# Patient Record
Sex: MALE | Race: OTHER | HISPANIC OR LATINO | Employment: UNEMPLOYED | URBAN - METROPOLITAN AREA
[De-identification: names, ages, dates, MRNs, and addresses within clinical notes are randomized per-mention and may not be internally consistent; named-entity substitution may affect disease eponyms.]

---

## 2022-07-12 ENCOUNTER — HOSPITAL ENCOUNTER (EMERGENCY)
Facility: HOSPITAL | Age: 27
Discharge: HOME | End: 2022-07-12

## 2022-07-12 ENCOUNTER — APPOINTMENT (EMERGENCY)
Dept: RADIOLOGY | Facility: HOSPITAL | Age: 27
End: 2022-07-12

## 2022-07-12 VITALS
SYSTOLIC BLOOD PRESSURE: 142 MMHG | TEMPERATURE: 98.7 F | DIASTOLIC BLOOD PRESSURE: 85 MMHG | HEART RATE: 85 BPM | RESPIRATION RATE: 20 BRPM | WEIGHT: 167.55 LBS | OXYGEN SATURATION: 99 %

## 2022-07-12 DIAGNOSIS — S43.015A ANTERIOR DISLOCATION OF LEFT SHOULDER: Primary | ICD-10-CM

## 2022-07-12 PROCEDURE — 73030 X-RAY EXAM OF SHOULDER: CPT

## 2022-07-12 PROCEDURE — 23650 CLTX SHO DSLC W/MNPJ WO ANES: CPT | Performed by: PHYSICIAN ASSISTANT

## 2022-07-12 PROCEDURE — 73020 X-RAY EXAM OF SHOULDER: CPT

## 2022-07-12 PROCEDURE — 99285 EMERGENCY DEPT VISIT HI MDM: CPT | Performed by: PHYSICIAN ASSISTANT

## 2022-07-12 PROCEDURE — 96372 THER/PROPH/DIAG INJ SC/IM: CPT

## 2022-07-12 PROCEDURE — 99283 EMERGENCY DEPT VISIT LOW MDM: CPT

## 2022-07-12 RX ORDER — MORPHINE SULFATE 10 MG/ML
6 INJECTION, SOLUTION INTRAMUSCULAR; INTRAVENOUS ONCE
Status: DISCONTINUED | OUTPATIENT
Start: 2022-07-12 | End: 2022-07-12

## 2022-07-12 RX ORDER — DIAZEPAM 5 MG/1
5 TABLET ORAL ONCE
Status: COMPLETED | OUTPATIENT
Start: 2022-07-12 | End: 2022-07-12

## 2022-07-12 RX ORDER — PROPOFOL 10 MG/ML
150 INJECTION, EMULSION INTRAVENOUS ONCE
Status: DISCONTINUED | OUTPATIENT
Start: 2022-07-12 | End: 2022-07-13 | Stop reason: HOSPADM

## 2022-07-12 RX ORDER — MORPHINE SULFATE 10 MG/ML
6 INJECTION, SOLUTION INTRAMUSCULAR; INTRAVENOUS ONCE
Status: COMPLETED | OUTPATIENT
Start: 2022-07-12 | End: 2022-07-12

## 2022-07-12 RX ADMIN — DIAZEPAM 5 MG: 5 TABLET ORAL at 22:58

## 2022-07-12 RX ADMIN — MORPHINE SULFATE 6 MG: 10 INJECTION INTRAVENOUS at 22:27

## 2022-07-13 NOTE — ED NOTES
ED physician at bedside preparing for reduction of left shoulder     Angela Barton, LANCE  07/12/22 4888

## 2022-07-13 NOTE — ED PROVIDER NOTES
History  Chief Complaint   Patient presents with    Fall     Pt stated he fell from standing position after tripping in sandal onto dirt, approx 1 hour ago, dislocating L shoulder     24 yo with left shoulder injury  [place]  States it feels like it was dislocated  No numbness, tingling, weakness  Reports decreased ROM  No prior h/o dislocation  History provided by:  Patient   used: No    Shoulder Injury  Location:  Shoulder  Shoulder location:  L shoulder  Injury: yes    Time since incident:  1 hour  Mechanism of injury: fall    Pain details:     Quality:  Aching    Radiates to:  Does not radiate    Severity:  Moderate    Onset quality:  Sudden    Duration:  1 hour    Timing:  Constant    Progression:  Unchanged  Dislocation: yes    Relieved by:  Nothing  Worsened by: Movement  Ineffective treatments:  None tried  Associated symptoms: decreased range of motion    Associated symptoms: no back pain, no fatigue, no fever, no muscle weakness, no neck pain, no numbness, no stiffness, no swelling and no tingling        None       No past medical history on file  No past surgical history on file  No family history on file  I have reviewed and agree with the history as documented  No existing history information found  No existing history information found  Review of Systems   Constitutional: Negative for chills, fatigue and fever  HENT: Negative for ear pain and sore throat  Eyes: Negative for pain and visual disturbance  Respiratory: Negative for cough and shortness of breath  Cardiovascular: Negative for chest pain and palpitations  Gastrointestinal: Negative for abdominal pain and vomiting  Genitourinary: Negative for dysuria and hematuria  Musculoskeletal: Negative for arthralgias, back pain, neck pain and stiffness  Skin: Negative for color change and rash  Neurological: Negative for seizures and syncope     All other systems reviewed and are negative  Physical Exam  Physical Exam    Vital Signs  ED Triage Vitals [07/12/22 2214]   Temperature Pulse Respirations Blood Pressure SpO2   98 7 °F (37 1 °C) 85 20 142/85 99 %      Temp Source Heart Rate Source Patient Position - Orthostatic VS BP Location FiO2 (%)   Oral -- Sitting Right arm --      Pain Score       10 - Worst Possible Pain           Vitals:    07/12/22 2214   BP: 142/85   Pulse: 85   Patient Position - Orthostatic VS: Sitting         Visual Acuity      ED Medications  Medications   morphine injection 6 mg (6 mg Intramuscular Given 7/12/22 2227)   diazepam (VALIUM) tablet 5 mg (5 mg Oral Given 7/12/22 2258)       Diagnostic Studies  Results Reviewed     None                 XR shoulder 1 vw LEFT POST REDUCTION   ED Interpretation by Masha Goodrich PA-C (07/12 2328)   reduced      Final Result by Byron Anaya MD (07/13 8046)      Interval reduction of dislocated glenohumeral joint  Appropriate alignment based on single AP view  Y view is helpful for more accurate assessment  Workstation performed: [id number]         XR shoulder 2+ views LEFT   ED Interpretation by Masha Goodrich PA-C (07/12 2257)   Anterior dislocation      Final Result by Byron Anaya MD (07/13 5961)      Anterior dislocation of the glenohumeral joint  Workstation performed: [id number]                    Procedures  Orthopedic injury treatment    Date/Time: 7/12/2022 11:00 PM  Performed by: Masha Goodrich PA-C  Authorized by: Masha Goodrich PA-C     Patient Location:  ED  [place] Protocol:  Consent: Verbal consent obtained    Consent given by: patient  Patient identity confirmed: verbally with patient, arm band and provided demographic data      Injury location:  Shoulder  Location details:  Left shoulder  Injury type:  Dislocation  Dislocation type: anterior    Chronicity:  New  Distal perfusion: normal    Neurological function: normal    Range of motion: normal    Local anesthesia used?: No    General anesthesia used?: No    Immobilization:  Sling  Neurovascular status: Neurovascularly intact    Distal perfusion: normal    Neurological function: normal    Range of motion: normal    Patient tolerance:  Patient tolerated the procedure well with no immediate complications             ED Course  ED Course as of 07/13/22 1840   Tue Jul 12, 2022   2253 Valium for anxiolysis                                             MDM  Number of Diagnoses or Management Options  Anterior dislocation of left shoulder: new and requires workup  Diagnosis management comments: Differential diagnosis including but not limited to: sprain, strain, fracture, dislocation, contusion; doubt compartment syndrome  Plan: XR  Amount and/or Complexity of Data Reviewed  Tests in the radiology section of CPT®: ordered and reviewed    Risk of Complications, Morbidity, and/or Mortality  Presenting problems: low  Management options: low  General comments: 22 yo with shoulder injury  XR confirms dislocation  Reduced after analgesia  Tolerated well  Discussed sling use and ortho  Pain much improved  NVI after sling and reduction  Normal pulses  Return parameters provided  Pt understands and agrees with plan  Patient Progress  Patient progress: stable      Disposition  Final diagnoses:   Anterior dislocation of left shoulder     Time reflects when diagnosis was documented in both MDM as applicable and the Disposition within this note     Time User Action Codes Description Comment    7/12/2022 11:27 PM Daniel Nava Add [[id number]] Anterior dislocation of left shoulder       ED Disposition     ED Disposition   Discharge    Condition   Stable    Date/Time   Tue Jul 12, 2022 11:26 PM    Comment   Amandeep Tarango discharge to home/self care                 Follow-up Information     Follow up With Specialties Details Why Contact Info Additional [address] [facility] Call for follow up appointment [address] [facility] [address]  [id number]  [address] [facility], [address], [place], [place], [address] [phone]          There are no discharge medications for this patient  No discharge procedures on file      PDMP Review     None          ED Provider  Electronically Signed by           Kathy Zamora PA-C  07/13/22 2922

## 2022-07-18 NOTE — QUICK NOTE
Physical Exam  Vitals and nursing note reviewed  Constitutional:       Appearance: He is well-developed  HENT:      Head: Normocephalic and atraumatic  Eyes:      Conjunctiva/sclera: Conjunctivae normal    Cardiovascular:      Rate and Rhythm: Normal rate and regular rhythm  Heart sounds: No murmur heard  Pulmonary:      Effort: Pulmonary effort is normal  No respiratory distress  Breath sounds: Normal breath sounds  Abdominal:      Palpations: Abdomen is soft  Tenderness: There is no abdominal tenderness  Musculoskeletal:      Right shoulder: Bony tenderness present  Normal pulse  Cervical back: Neck supple  Comments: Right shoulder dislocation  Normal pulses  NVI  Normal  strength (right)   Skin:     General: Skin is warm and dry  Neurological:      Mental Status: He is alert

## 2022-07-25 NOTE — QUICK NOTE
Physical Exam  Vitals and nursing note reviewed  Constitutional:       Appearance: He is well-developed  HENT:      Head: Normocephalic and atraumatic  Eyes:      Conjunctiva/sclera: Conjunctivae normal    Cardiovascular:      Rate and Rhythm: Normal rate and regular rhythm  Pulses:           Radial pulses are 2+ on the left side  Heart sounds: No murmur heard  Pulmonary:      Effort: Pulmonary effort is normal  No respiratory distress  Breath sounds: Normal breath sounds  Abdominal:      Palpations: Abdomen is soft  Tenderness: There is no abdominal tenderness  Musculoskeletal:      Left shoulder: Swelling, tenderness and bony tenderness present  Decreased range of motion  Arms:       Cervical back: Neck supple  Skin:     General: Skin is warm and dry  Neurological:      Mental Status: He is alert and oriented to person, place, and time  GCS: GCS eye subscore is 4  GCS verbal subscore is 5  GCS motor subscore is 6  Comments: GCS 15  AAOx3  Ambulating in department without difficulty  CN II-XII grossly intact  No focal neuro deficits